# Patient Record
Sex: FEMALE | URBAN - METROPOLITAN AREA
[De-identification: names, ages, dates, MRNs, and addresses within clinical notes are randomized per-mention and may not be internally consistent; named-entity substitution may affect disease eponyms.]

---

## 2020-07-07 ENCOUNTER — APPOINTMENT (RX ONLY)
Dept: URBAN - METROPOLITAN AREA CLINIC 156 | Facility: CLINIC | Age: 85
Setting detail: DERMATOLOGY
End: 2020-07-07

## 2020-07-07 DIAGNOSIS — L27.0 GENERALIZED SKIN ERUPTION DUE TO DRUGS AND MEDICAMENTS TAKEN INTERNALLY: ICD-10-CM

## 2020-07-07 DIAGNOSIS — L20.89 OTHER ATOPIC DERMATITIS: ICD-10-CM

## 2020-07-07 PROBLEM — L30.9 DERMATITIS, UNSPECIFIED: Status: ACTIVE | Noted: 2020-07-07

## 2020-07-07 PROBLEM — L20.84 INTRINSIC (ALLERGIC) ECZEMA: Status: ACTIVE | Noted: 2020-07-07

## 2020-07-07 PROCEDURE — ? COUNSELING

## 2020-07-07 PROCEDURE — ? OTHER

## 2020-07-07 PROCEDURE — 96910 PHOTCHMTX TAR&UVB/PTRLTM&UVB: CPT

## 2020-07-07 PROCEDURE — ? PHOTOTHERAPY TREATMENT

## 2020-07-07 PROCEDURE — ? BIOPSY BY PUNCH METHOD

## 2020-07-07 PROCEDURE — 11104 PUNCH BX SKIN SINGLE LESION: CPT

## 2020-07-07 ASSESSMENT — LOCATION DETAILED DESCRIPTION DERM
LOCATION DETAILED: LEFT PROXIMAL DORSAL FOREARM
LOCATION DETAILED: LEFT SUPERIOR LATERAL LOWER BACK

## 2020-07-07 ASSESSMENT — LOCATION SIMPLE DESCRIPTION DERM
LOCATION SIMPLE: LEFT FOREARM
LOCATION SIMPLE: LEFT LOWER BACK

## 2020-07-07 ASSESSMENT — BSA RASH: BSA RASH: 10

## 2020-07-07 ASSESSMENT — LOCATION ZONE DERM
LOCATION ZONE: TRUNK
LOCATION ZONE: ARM

## 2020-07-07 NOTE — PROCEDURE: BIOPSY BY PUNCH METHOD
Detail Level: Detailed
Was A Bandage Applied: Yes
Punch Size In Mm: 5
Biopsy Type: H and E
Anesthesia Type: 2% lidocaine with epinephrine
Anesthesia Volume In Cc (Will Not Render If 0): 1
Additional Anesthesia Volume In Cc (Will Not Render If 0): 0
Hemostasis: None
Epidermal Sutures: 4-0 Polypropylene
Wound Care: Bacitracin
Dressing: bandage
Suture Removal: 14 days
Patient Will Remove Sutures At Home?: No
Lab: [id number]
Lab Facility: [id number]
Consent: Verbal consent was obtained and risks were reviewed including but not limited to scarring, infection, bleeding, scabbing, incomplete removal, nerve damage and allergy to anesthesia.
Post-Care Instructions: I reviewed with the patient in detail post-care instructions. Patient is to keep the biopsy site dry overnight, and then apply bacitracin twice daily until healed. Patient may apply hydrogen peroxide soaks to remove any crusting.
Home Suture Removal Text: Patient was provided a home suture removal kit and will remove their sutures at home.  If they have any questions or difficulties they will call the office.
Notification Instructions: Patient will be notified of biopsy results. However, patient instructed to call the office if not contacted within 2 weeks.
Billing Type: Third-Party Bill
Information: Selecting Yes will display possible errors in your note based on the variables you have selected. This validation is only offered as a suggestion for you. PLEASE NOTE THAT THE VALIDATION TEXT WILL BE REMOVED WHEN YOU FINALIZE YOUR NOTE. IF YOU WANT TO FAX A PRELIMINARY NOTE YOU WILL NEED TO TOGGLE THIS TO 'NO' IF YOU DO NOT WANT IT IN YOUR FAXED NOTE.

## 2020-07-07 NOTE — PROCEDURE: MIPS QUALITY
Detail Level: Detailed
Quality 110: Preventive Care And Screening: Influenza Immunization: Influenza Immunization Administered during Influenza season
Quality 226: Preventive Care And Screening: Tobacco Use: Screening And Cessation Intervention: Patient screened for tobacco use and is an ex/non-smoker
Quality 130: Documentation Of Current Medications In The Medical Record: Current Medications Documented
Quality 431: Preventive Care And Screening: Unhealthy Alcohol Use - Screening: Patient screened for unhealthy alcohol use using a single question and scores less than 2 times per year
Quality 111:Pneumonia Vaccination Status For Older Adults: Pneumococcal Vaccination Previously Received

## 2020-07-07 NOTE — PROCEDURE: OTHER
Note Text (......Xxx Chief Complaint.): This diagnosis correlates with the
Detail Level: Zone
Other (Free Text): Patient is set up for phototherapy, First treatment today.  Ordered by Dr. Willis Jimenez, her dermatologist in [place], [place].

## 2020-07-07 NOTE — PROCEDURE: PHOTOTHERAPY TREATMENT
Total Body Energy: 0.204
Protocol For Photochemotherapy: Mineral Oil And Broad Band Uvb: The patient received Photochemotherapy: Mineral Oil and Broad Band UVB.
Protocol For Bath Puva: The patient received Bath PUVA.
Protocol For Photochemotherapy: Triamcinolone Ointment And Nbuvb: The patient received Photochemotherapy: Triamcinolone and NBUVB (triamcinolone ointment applied to all lesions prior to phototherapy).
Protocol For Uva1: The patient received UVA1.
Protocol For Photochemotherapy: Tar And Nbuvb (Goeckerman Treatment): The patient received Photochemotherapy: Tar and NBUVB (Goeckerman treatment).
Post-Care Instructions: I reviewed with the patient in detail post-care instructions. Patient is to wear sun protection. Patients may expect sunburn like redness, discomfort and scabbing.
Protocol For Photochemotherapy For Severe Photoresponsive Dermatoses: Tar And Broad Band Uvb (Goeckerman Treatment): The patient received Photochemotherapy for severe photoresponsive dermatoses: Tar and Broad Band UVB (Goeckerman treatment) requiring at least 4 to 8 hours of care under direct physician supervision.
Protocol For Photochemotherapy For Severe Photoresponsive Dermatoses: Tar And Nbuvb (Goeckerman Treatment): The patient received Photochemotherapy for severe photoresponsive dermatoses: Tar and NBUVB (Goeckerman treatment) requiring at least 4 to 8 hours of care under direct physician supervision.
Protocol For Broad Band Uvb: The patient received Broad Band UVB.
Protocol For Photochemotherapy: Petrolatum And Nbuvb: The patient received Photochemotherapy: Petrolatum and NBUVB (petrolatum applied to all lesions prior to phototherapy).
Protocol: Photochemotherapy: Mineral Oil and NBUVB
Protocol For Protocol For Photochemotherapy For Severe Photoresponsive Dermatoses: Bath Puva: The patient received Photochemotherapy for severe photoresponsive dermatoses: Bath PUVA requiring at least 4 to 8 hours of care under direct physician supervision.
Protocol For Photochemotherapy For Severe Photoresponsive Dermatoses: Petrolatum And Broad Band Uvb: The patient received Photochemotherapyfor severe photoresponsive dermatoses: Petrolatum and Broad Band UVB requiring at least 4 to 8 hours of care under direct physician supervision.
Detail Level: Zone
Protocol For Photochemotherapy For Severe Photoresponsive Dermatoses: Petrolatum And Nbuvb: The patient received Photochemotherapy for severe photoresponsive dermatoses: Petrolatum and NBUVB requiring at least 4 to 8 hours of care under direct physician supervision.
Total Treatment Time: 0.20
Consent: Written consent obtained.  The risks were reviewed with the patient including but not limited to: burn, pigmentary changes, pain, blistering, scabbing, redness, increased risk of skin cancers, and the remote possibility of scarring.
Protocol For Photochemotherapy: Baby Oil And Nbuvb: The patient received Photochemotherapy: Baby Oil and NBUVB (baby oil applied to all lesions prior to phototherapy).
Protocol For Photochemotherapy: Tar And Broad Band Uvb (Goeckerman Treatment): The patient received Photochemotherapy: Tar and Broad Band UVB (Goeckerman treatment).
Protocol For Puva: The patient received PUVA.
Protocol For Nb Uva: The patient received NB UVA.
Protocol For Photochemotherapy: Mineral Oil And Nbuvb: The patient received Photochemotherapy: Mineral Oil and NBUVB (mineral oil applied to all lesions prior to phototherapy).
Treatment Number: 1
Render Post-Care In The Note: no
Protocol For Photochemotherapy: Petrolatum And Broad Band Uvb: The patient received Photochemotherapy: Petrolatum and Broad Band UVB.
Name Of Supervising Technician: Shayy Joel CMA
Protocol For Photochemotherapy For Severe Photoresponsive Dermatoses: Puva: The patient received Photochemotherapy for severe photoresponsive dermatoses: PUVA requiring at least 4 to 8 hours of care under direct physician supervision.
Protocol For Uva: The patient received UVA.
Protocol For Nbuvb: The patient received NBUVB.

## 2020-07-09 ENCOUNTER — APPOINTMENT (RX ONLY)
Dept: URBAN - METROPOLITAN AREA CLINIC 156 | Facility: CLINIC | Age: 85
Setting detail: DERMATOLOGY
End: 2020-07-09

## 2020-07-09 DIAGNOSIS — L20.89 OTHER ATOPIC DERMATITIS: ICD-10-CM

## 2020-07-09 PROBLEM — L20.84 INTRINSIC (ALLERGIC) ECZEMA: Status: ACTIVE | Noted: 2020-07-09

## 2020-07-09 PROCEDURE — 96910 PHOTCHMTX TAR&UVB/PTRLTM&UVB: CPT

## 2020-07-09 PROCEDURE — ? PHOTOTHERAPY TREATMENT

## 2020-07-09 NOTE — PROCEDURE: PHOTOTHERAPY TREATMENT
Total Body Energy: 0.402
Protocol For Photochemotherapy: Mineral Oil And Broad Band Uvb: The patient received Photochemotherapy: Mineral Oil and Broad Band UVB.
Protocol For Bath Puva: The patient received Bath PUVA.
Protocol For Photochemotherapy: Triamcinolone Ointment And Nbuvb: The patient received Photochemotherapy: Triamcinolone and NBUVB (triamcinolone ointment applied to all lesions prior to phototherapy).
Protocol For Uva1: The patient received UVA1.
Protocol For Photochemotherapy: Tar And Nbuvb (Goeckerman Treatment): The patient received Photochemotherapy: Tar and NBUVB (Goeckerman treatment).
Post-Care Instructions: I reviewed with the patient in detail post-care instructions. Patient is to wear sun protection. Patients may expect sunburn like redness, discomfort and scabbing.
Protocol For Photochemotherapy For Severe Photoresponsive Dermatoses: Tar And Broad Band Uvb (Goeckerman Treatment): The patient received Photochemotherapy for severe photoresponsive dermatoses: Tar and Broad Band UVB (Goeckerman treatment) requiring at least 4 to 8 hours of care under direct physician supervision.
Protocol For Photochemotherapy For Severe Photoresponsive Dermatoses: Tar And Nbuvb (Goeckerman Treatment): The patient received Photochemotherapy for severe photoresponsive dermatoses: Tar and NBUVB (Goeckerman treatment) requiring at least 4 to 8 hours of care under direct physician supervision.
Protocol For Broad Band Uvb: The patient received Broad Band UVB.
Protocol For Photochemotherapy: Petrolatum And Nbuvb: The patient received Photochemotherapy: Petrolatum and NBUVB (petrolatum applied to all lesions prior to phototherapy).
Protocol: Photochemotherapy: Mineral Oil and NBUVB
Protocol For Protocol For Photochemotherapy For Severe Photoresponsive Dermatoses: Bath Puva: The patient received Photochemotherapy for severe photoresponsive dermatoses: Bath PUVA requiring at least 4 to 8 hours of care under direct physician supervision.
Protocol For Photochemotherapy For Severe Photoresponsive Dermatoses: Petrolatum And Broad Band Uvb: The patient received Photochemotherapyfor severe photoresponsive dermatoses: Petrolatum and Broad Band UVB requiring at least 4 to 8 hours of care under direct physician supervision.
Detail Level: Zone
Protocol For Photochemotherapy For Severe Photoresponsive Dermatoses: Petrolatum And Nbuvb: The patient received Photochemotherapy for severe photoresponsive dermatoses: Petrolatum and NBUVB requiring at least 4 to 8 hours of care under direct physician supervision.
Total Treatment Time: 0.39
Consent: Written consent obtained.  The risks were reviewed with the patient including but not limited to: burn, pigmentary changes, pain, blistering, scabbing, redness, increased risk of skin cancers, and the remote possibility of scarring.
Protocol For Photochemotherapy: Baby Oil And Nbuvb: The patient received Photochemotherapy: Baby Oil and NBUVB (baby oil applied to all lesions prior to phototherapy).
Protocol For Photochemotherapy: Tar And Broad Band Uvb (Goeckerman Treatment): The patient received Photochemotherapy: Tar and Broad Band UVB (Goeckerman treatment).
Protocol For Puva: The patient received PUVA.
Protocol For Nb Uva: The patient received NB UVA.
Protocol For Photochemotherapy: Mineral Oil And Nbuvb: The patient received Photochemotherapy: Mineral Oil and NBUVB (mineral oil applied to all lesions prior to phototherapy).
Treatment Number: 2
Render Post-Care In The Note: no
Protocol For Photochemotherapy: Petrolatum And Broad Band Uvb: The patient received Photochemotherapy: Petrolatum and Broad Band UVB.
Name Of Supervising Technician: Shayy Joel CMA
Protocol For Photochemotherapy For Severe Photoresponsive Dermatoses: Puva: The patient received Photochemotherapy for severe photoresponsive dermatoses: PUVA requiring at least 4 to 8 hours of care under direct physician supervision.
Protocol For Uva: The patient received UVA.
Protocol For Nbuvb: The patient received NBUVB.

## 2020-07-13 ENCOUNTER — APPOINTMENT (RX ONLY)
Dept: URBAN - METROPOLITAN AREA CLINIC 156 | Facility: CLINIC | Age: 85
Setting detail: DERMATOLOGY
End: 2020-07-13

## 2020-07-13 DIAGNOSIS — L20.89 OTHER ATOPIC DERMATITIS: ICD-10-CM

## 2020-07-13 PROBLEM — L20.84 INTRINSIC (ALLERGIC) ECZEMA: Status: ACTIVE | Noted: 2020-07-13

## 2020-07-13 PROCEDURE — ? PHOTOTHERAPY TREATMENT

## 2020-07-13 PROCEDURE — 96910 PHOTCHMTX TAR&UVB/PTRLTM&UVB: CPT

## 2020-07-13 NOTE — PROCEDURE: PHOTOTHERAPY TREATMENT
Protocol For Uva: The patient received UVA.
Protocol For Photochemotherapy: Triamcinolone Ointment And Nbuvb: The patient received Photochemotherapy: Triamcinolone and NBUVB (triamcinolone ointment applied to all lesions prior to phototherapy).
Consent: Written consent obtained.  The risks were reviewed with the patient including but not limited to: burn, pigmentary changes, pain, blistering, scabbing, redness, increased risk of skin cancers, and the remote possibility of scarring.
Protocol For Photochemotherapy: Mineral Oil And Nbuvb: The patient received Photochemotherapy: Mineral Oil and NBUVB (mineral oil applied to all lesions prior to phototherapy).
Protocol For Nbuvb: The patient received NBUVB.
Detail Level: Zone
Protocol For Photochemotherapy For Severe Photoresponsive Dermatoses: Puva: The patient received Photochemotherapy for severe photoresponsive dermatoses: PUVA requiring at least 4 to 8 hours of care under direct physician supervision.
Protocol For Photochemotherapy For Severe Photoresponsive Dermatoses: Tar And Nbuvb (Goeckerman Treatment): The patient received Photochemotherapy for severe photoresponsive dermatoses: Tar and NBUVB (Goeckerman treatment) requiring at least 4 to 8 hours of care under direct physician supervision.
Render Post-Care In The Note: no
Name Of Supervising Technician: Shayy Joel CMA
Protocol For Photochemotherapy For Severe Photoresponsive Dermatoses: Tar And Broad Band Uvb (Goeckerman Treatment): The patient received Photochemotherapy for severe photoresponsive dermatoses: Tar and Broad Band UVB (Goeckerman treatment) requiring at least 4 to 8 hours of care under direct physician supervision.
Protocol For Photochemotherapy: Petrolatum And Nbuvb: The patient received Photochemotherapy: Petrolatum and NBUVB (petrolatum applied to all lesions prior to phototherapy).
Total Treatment Time: 0.59
Protocol For Photochemotherapy: Mineral Oil And Broad Band Uvb: The patient received Photochemotherapy: Mineral Oil and Broad Band UVB.
Protocol For Photochemotherapy: Tar And Nbuvb (Goeckerman Treatment): The patient received Photochemotherapy: Tar and NBUVB (Goeckerman treatment).
Protocol For Bath Puva: The patient received Bath PUVA.
Protocol For Uva1: The patient received UVA1.
Protocol For Photochemotherapy For Severe Photoresponsive Dermatoses: Petrolatum And Broad Band Uvb: The patient received Photochemotherapyfor severe photoresponsive dermatoses: Petrolatum and Broad Band UVB requiring at least 4 to 8 hours of care under direct physician supervision.
Total Body Energy: 0.602
Protocol For Photochemotherapy For Severe Photoresponsive Dermatoses: Petrolatum And Nbuvb: The patient received Photochemotherapy for severe photoresponsive dermatoses: Petrolatum and NBUVB requiring at least 4 to 8 hours of care under direct physician supervision.
Protocol For Protocol For Photochemotherapy For Severe Photoresponsive Dermatoses: Bath Puva: The patient received Photochemotherapy for severe photoresponsive dermatoses: Bath PUVA requiring at least 4 to 8 hours of care under direct physician supervision.
Treatment Number: 3
Protocol For Broad Band Uvb: The patient received Broad Band UVB.
Protocol For Puva: The patient received PUVA.
Protocol For Nb Uva: The patient received NB UVA.
Protocol For Photochemotherapy: Petrolatum And Broad Band Uvb: The patient received Photochemotherapy: Petrolatum and Broad Band UVB.
Protocol: Photochemotherapy: Mineral Oil and NBUVB
Protocol For Photochemotherapy: Tar And Broad Band Uvb (Goeckerman Treatment): The patient received Photochemotherapy: Tar and Broad Band UVB (Goeckerman treatment).
Protocol For Photochemotherapy: Baby Oil And Nbuvb: The patient received Photochemotherapy: Baby Oil and NBUVB (baby oil applied to all lesions prior to phototherapy).
Post-Care Instructions: I reviewed with the patient in detail post-care instructions. Patient is to wear sun protection. Patients may expect sunburn like redness, discomfort and scabbing.